# Patient Record
Sex: FEMALE | Race: WHITE | URBAN - METROPOLITAN AREA
[De-identification: names, ages, dates, MRNs, and addresses within clinical notes are randomized per-mention and may not be internally consistent; named-entity substitution may affect disease eponyms.]

---

## 2023-08-26 ENCOUNTER — APPOINTMENT (OUTPATIENT)
Dept: GENERAL RADIOLOGY | Age: 33
End: 2023-08-26
Payer: COMMERCIAL

## 2023-08-26 ENCOUNTER — HOSPITAL ENCOUNTER (EMERGENCY)
Age: 33
Discharge: HOME | End: 2023-08-27
Attending: EMERGENCY MEDICINE
Payer: COMMERCIAL

## 2023-08-26 ENCOUNTER — APPOINTMENT (OUTPATIENT)
Dept: CT IMAGING | Age: 33
End: 2023-08-26
Payer: COMMERCIAL

## 2023-08-26 DIAGNOSIS — F19.10 POLYSUBSTANCE ABUSE (HCC): Primary | ICD-10-CM

## 2023-08-26 DIAGNOSIS — T42.4X1A BENZODIAZEPINE OVERDOSE, ACCIDENTAL OR UNINTENTIONAL, INITIAL ENCOUNTER: ICD-10-CM

## 2023-08-26 LAB
ALBUMIN SERPL-MCNC: 4.4 G/DL (ref 3.5–5.2)
ALP SERPL-CCNC: 67 U/L (ref 35–104)
ALT SERPL-CCNC: 7 U/L (ref 0–32)
AMORPH SED URNS QL MICRO: PRESENT
AMPHET UR QL SCN: POSITIVE
ANION GAP SERPL CALCULATED.3IONS-SCNC: 13 MMOL/L (ref 7–16)
AST SERPL-CCNC: 16 U/L (ref 0–31)
BACTERIA URNS QL MICRO: ABNORMAL
BARBITURATES UR QL SCN: NEGATIVE
BASOPHILS # BLD: 0.04 K/UL (ref 0–0.2)
BASOPHILS NFR BLD: 1 % (ref 0–2)
BENZODIAZ UR QL: POSITIVE
BILIRUB SERPL-MCNC: 0.4 MG/DL (ref 0–1.2)
BILIRUB UR QL STRIP: ABNORMAL
BUN SERPL-MCNC: 8 MG/DL (ref 6–20)
BUPRENORPHINE UR QL: NEGATIVE
CALCIUM SERPL-MCNC: 8.9 MG/DL (ref 8.6–10.2)
CANNABINOIDS UR QL SCN: NEGATIVE
CHLORIDE SERPL-SCNC: 102 MMOL/L (ref 98–107)
CHP ED QC CHECK: NORMAL
CLARITY UR: CLEAR
CO2 SERPL-SCNC: 27 MMOL/L (ref 22–29)
COCAINE UR QL SCN: POSITIVE
COLOR UR: ABNORMAL
CREAT SERPL-MCNC: 0.9 MG/DL (ref 0.5–1)
EOSINOPHIL # BLD: 0.78 K/UL (ref 0.05–0.5)
EOSINOPHILS RELATIVE PERCENT: 9 % (ref 0–6)
EPI CELLS #/AREA URNS HPF: ABNORMAL /HPF
ERYTHROCYTE [DISTWIDTH] IN BLOOD BY AUTOMATED COUNT: 13.3 % (ref 11.5–15)
FENTANYL UR QL: POSITIVE
GFR SERPL CREATININE-BSD FRML MDRD: >60 ML/MIN/1.73M2
GLUCOSE BLD-MCNC: 86 MG/DL
GLUCOSE BLD-MCNC: 86 MG/DL (ref 74–99)
GLUCOSE SERPL-MCNC: 79 MG/DL (ref 74–99)
GLUCOSE UR STRIP-MCNC: 100 MG/DL
HCG SERPL QL: NEGATIVE
HCT VFR BLD AUTO: 41.4 % (ref 34–48)
HGB BLD-MCNC: 13.7 G/DL (ref 11.5–15.5)
HGB UR QL STRIP.AUTO: ABNORMAL
IMM GRANULOCYTES # BLD AUTO: 0.04 K/UL (ref 0–0.58)
IMM GRANULOCYTES NFR BLD: 1 % (ref 0–5)
KETONES UR STRIP-MCNC: NEGATIVE MG/DL
LEUKOCYTE ESTERASE UR QL STRIP: ABNORMAL
LYMPHOCYTES NFR BLD: 3.06 K/UL (ref 1.5–4)
LYMPHOCYTES RELATIVE PERCENT: 36 % (ref 20–42)
MCH RBC QN AUTO: 30.4 PG (ref 26–35)
MCHC RBC AUTO-ENTMCNC: 33.1 G/DL (ref 32–34.5)
MCV RBC AUTO: 92 FL (ref 80–99.9)
METHADONE UR QL: NEGATIVE
MONOCYTES NFR BLD: 0.73 K/UL (ref 0.1–0.95)
MONOCYTES NFR BLD: 9 % (ref 2–12)
NEUTROPHILS NFR BLD: 45 % (ref 43–80)
NEUTS SEG NFR BLD: 3.85 K/UL (ref 1.8–7.3)
NITRITE UR QL STRIP: POSITIVE
OPIATES UR QL SCN: POSITIVE
OXYCODONE UR QL SCN: NEGATIVE
PCP UR QL SCN: NEGATIVE
PH UR STRIP: 5.5 [PH] (ref 5–9)
PLATELET # BLD AUTO: 253 K/UL (ref 130–450)
PMV BLD AUTO: 10.2 FL (ref 7–12)
POTASSIUM SERPL-SCNC: 3.4 MMOL/L (ref 3.5–5)
PROT SERPL-MCNC: 7.5 G/DL (ref 6.4–8.3)
PROT UR STRIP-MCNC: 100 MG/DL
RBC # BLD AUTO: 4.5 M/UL (ref 3.5–5.5)
RBC #/AREA URNS HPF: ABNORMAL /HPF
SODIUM SERPL-SCNC: 142 MMOL/L (ref 132–146)
SP GR UR STRIP: >1.03 (ref 1–1.03)
TEST INFORMATION: ABNORMAL
UROBILINOGEN UR STRIP-ACNC: 1 EU/DL (ref 0–1)
WBC #/AREA URNS HPF: ABNORMAL /HPF
WBC OTHER # BLD: 8.5 K/UL (ref 4.5–11.5)

## 2023-08-26 PROCEDURE — 6360000002 HC RX W HCPCS: Performed by: EMERGENCY MEDICINE

## 2023-08-26 PROCEDURE — 82962 GLUCOSE BLOOD TEST: CPT

## 2023-08-26 PROCEDURE — 84703 CHORIONIC GONADOTROPIN ASSAY: CPT

## 2023-08-26 PROCEDURE — 2580000003 HC RX 258: Performed by: EMERGENCY MEDICINE

## 2023-08-26 PROCEDURE — 99285 EMERGENCY DEPT VISIT HI MDM: CPT

## 2023-08-26 PROCEDURE — 96374 THER/PROPH/DIAG INJ IV PUSH: CPT

## 2023-08-26 PROCEDURE — G0480 DRUG TEST DEF 1-7 CLASSES: HCPCS

## 2023-08-26 PROCEDURE — 85025 COMPLETE CBC W/AUTO DIFF WBC: CPT

## 2023-08-26 PROCEDURE — 93005 ELECTROCARDIOGRAM TRACING: CPT | Performed by: EMERGENCY MEDICINE

## 2023-08-26 PROCEDURE — 71045 X-RAY EXAM CHEST 1 VIEW: CPT

## 2023-08-26 PROCEDURE — 70450 CT HEAD/BRAIN W/O DYE: CPT

## 2023-08-26 PROCEDURE — 80179 DRUG ASSAY SALICYLATE: CPT

## 2023-08-26 PROCEDURE — 80053 COMPREHEN METABOLIC PANEL: CPT

## 2023-08-26 PROCEDURE — 96361 HYDRATE IV INFUSION ADD-ON: CPT

## 2023-08-26 PROCEDURE — 80143 DRUG ASSAY ACETAMINOPHEN: CPT

## 2023-08-26 PROCEDURE — 81001 URINALYSIS AUTO W/SCOPE: CPT

## 2023-08-26 PROCEDURE — 80307 DRUG TEST PRSMV CHEM ANLYZR: CPT

## 2023-08-26 RX ORDER — NALOXONE HYDROCHLORIDE 1 MG/ML
2 INJECTION INTRAMUSCULAR; INTRAVENOUS; SUBCUTANEOUS ONCE
Status: DISCONTINUED | OUTPATIENT
Start: 2023-08-26 | End: 2023-08-27 | Stop reason: HOSPADM

## 2023-08-26 RX ORDER — NALOXONE HYDROCHLORIDE 1 MG/ML
2 INJECTION INTRAMUSCULAR; INTRAVENOUS; SUBCUTANEOUS ONCE
Status: COMPLETED | OUTPATIENT
Start: 2023-08-26 | End: 2023-08-26

## 2023-08-26 RX ORDER — 0.9 % SODIUM CHLORIDE 0.9 %
1000 INTRAVENOUS SOLUTION INTRAVENOUS ONCE
Status: COMPLETED | OUTPATIENT
Start: 2023-08-26 | End: 2023-08-26

## 2023-08-26 RX ADMIN — NALOXONE HYDROCHLORIDE 2 MG: 1 INJECTION PARENTERAL at 16:16

## 2023-08-26 RX ADMIN — SODIUM CHLORIDE 1000 ML: 9 INJECTION, SOLUTION INTRAVENOUS at 16:33

## 2023-08-27 VITALS
SYSTOLIC BLOOD PRESSURE: 128 MMHG | TEMPERATURE: 97.2 F | HEIGHT: 65 IN | HEART RATE: 96 BPM | OXYGEN SATURATION: 99 % | BODY MASS INDEX: 23.32 KG/M2 | RESPIRATION RATE: 14 BRPM | WEIGHT: 140 LBS | DIASTOLIC BLOOD PRESSURE: 94 MMHG

## 2023-08-27 LAB
APAP SERPL-MCNC: <5 UG/ML (ref 10–30)
EKG ATRIAL RATE: 94 BPM
EKG P AXIS: 59 DEGREES
EKG P-R INTERVAL: 128 MS
EKG Q-T INTERVAL: 358 MS
EKG QRS DURATION: 84 MS
EKG QTC CALCULATION (BAZETT): 447 MS
EKG R AXIS: 77 DEGREES
EKG T AXIS: 23 DEGREES
EKG VENTRICULAR RATE: 94 BPM
ETHANOLAMINE SERPL-MCNC: <10 MG/DL
SALICYLATES SERPL-MCNC: <0.3 MG/DL (ref 0–30)
TOXIC TRICYCLIC SC,BLOOD: NEGATIVE

## 2023-08-27 PROCEDURE — 6370000000 HC RX 637 (ALT 250 FOR IP): Performed by: EMERGENCY MEDICINE

## 2023-08-27 PROCEDURE — 93010 ELECTROCARDIOGRAM REPORT: CPT | Performed by: INTERNAL MEDICINE

## 2023-08-27 RX ORDER — GRANULES FOR ORAL 3 G/1
3 POWDER ORAL ONCE
Status: COMPLETED | OUTPATIENT
Start: 2023-08-27 | End: 2023-08-27

## 2023-08-27 RX ORDER — ACETAMINOPHEN 500 MG
1000 TABLET ORAL ONCE
Status: COMPLETED | OUTPATIENT
Start: 2023-08-27 | End: 2023-08-27

## 2023-08-27 RX ADMIN — GRANULES FOR ORAL SOLUTION 1 PACKET: 3 POWDER ORAL at 12:18

## 2023-08-27 RX ADMIN — ACETAMINOPHEN 1000 MG: 500 TABLET ORAL at 14:24

## 2023-08-27 NOTE — ED NOTES
Anival Sexton now states they have no one to talk to about sending pt to their facility. They will call back shortly.       ALYSON HOUGH RN  08/27/23 [id number]

## 2023-08-27 NOTE — PROGRESS NOTES
Spoke with [facility] at [facility]. She states their transporter will be available at 3pm and will come here to pick patient up and transport her to [facility] around 4pm.  On reevaluation patient is sleep, resting comfortably in bed with no acute distress. NSR on telemetry, normal O2 sats on room air. UA from yesterday resulted concerning for UTI. Treated w/ single dose fosfomycin. Patient declined treatment with buprenorphine.       --------------------------------- IMPRESSION AND DISPOSITION ---------------------------------    IMPRESSION  1.  Polysubstance abuse ([address])    2. Benzodiazepine overdose, accidental or unintentional, initial encounter        DISPOSITION  Disposition: Discharge to detox  Patient condition is good     Alycia Lloyd MD

## 2023-08-27 NOTE — ED NOTES
Attempted to call [facility] x2, no answer.       ALYSONKAHTRYN HOUGH, MARY  08/27/23 [id number]

## 2023-08-27 NOTE — ED NOTES
Spoke with [employer]. They stated to call back at 0830 to speak with someone regarding transporting the pt back to the facility.      ALYSON HOUGH RN  08/27/23 [id number]

## 2023-08-27 NOTE — ED NOTES
Ambulated patient to bathroom with +2 assist. Tolerated fairly well.      Shama Faria RN  08/26/23 1911